# Patient Record
Sex: FEMALE | Race: WHITE | NOT HISPANIC OR LATINO | Employment: UNEMPLOYED | URBAN - METROPOLITAN AREA
[De-identification: names, ages, dates, MRNs, and addresses within clinical notes are randomized per-mention and may not be internally consistent; named-entity substitution may affect disease eponyms.]

---

## 2017-12-20 ENCOUNTER — HOSPITAL ENCOUNTER (OUTPATIENT)
Dept: RADIOLOGY | Facility: MEDICAL CENTER | Age: 57
End: 2017-12-20
Attending: FAMILY MEDICINE
Payer: COMMERCIAL

## 2017-12-20 DIAGNOSIS — Z12.31 SCREENING MAMMOGRAM, ENCOUNTER FOR: ICD-10-CM

## 2017-12-20 PROCEDURE — G0202 SCR MAMMO BI INCL CAD: HCPCS

## 2020-01-14 ENCOUNTER — HOSPITAL ENCOUNTER (OUTPATIENT)
Dept: RADIOLOGY | Facility: MEDICAL CENTER | Age: 60
End: 2020-01-14
Attending: FAMILY MEDICINE
Payer: COMMERCIAL

## 2020-01-14 DIAGNOSIS — Z12.31 VISIT FOR SCREENING MAMMOGRAM: ICD-10-CM

## 2020-01-14 PROCEDURE — 77067 SCR MAMMO BI INCL CAD: CPT

## 2020-12-18 ENCOUNTER — HOSPITAL ENCOUNTER (OUTPATIENT)
Dept: RADIOLOGY | Facility: MEDICAL CENTER | Age: 60
End: 2020-12-18
Attending: FAMILY MEDICINE
Payer: COMMERCIAL

## 2020-12-18 DIAGNOSIS — M85.89 OTHER SPECIFIED DISORDERS OF BONE DENSITY AND STRUCTURE, MULTIPLE SITES: ICD-10-CM

## 2020-12-18 PROCEDURE — 77080 DXA BONE DENSITY AXIAL: CPT

## 2021-01-18 DIAGNOSIS — M85.89 OTHER SPECIFIED DISORDERS OF BONE DENSITY AND STRUCTURE, MULTIPLE SITES: ICD-10-CM

## 2021-10-05 ENCOUNTER — HOSPITAL ENCOUNTER (OUTPATIENT)
Dept: RADIOLOGY | Facility: MEDICAL CENTER | Age: 61
End: 2021-10-05
Attending: FAMILY MEDICINE
Payer: COMMERCIAL

## 2021-10-05 DIAGNOSIS — B17.9 ACUTE VIRAL HEPATITIS, UNSPECIFIED VIRAL HEPATITIS TYPE: ICD-10-CM

## 2021-10-05 PROCEDURE — 76700 US EXAM ABDOM COMPLETE: CPT

## 2021-12-20 ENCOUNTER — HOSPITAL ENCOUNTER (OUTPATIENT)
Dept: RADIOLOGY | Facility: MEDICAL CENTER | Age: 61
End: 2021-12-20
Payer: COMMERCIAL

## 2021-12-20 DIAGNOSIS — R29.890 LOSS OF HEIGHT: ICD-10-CM

## 2021-12-20 PROCEDURE — 77080 DXA BONE DENSITY AXIAL: CPT

## 2022-01-10 ENCOUNTER — HOSPITAL ENCOUNTER (OUTPATIENT)
Dept: LAB | Facility: MEDICAL CENTER | Age: 62
End: 2022-01-10
Attending: FAMILY MEDICINE
Payer: COMMERCIAL

## 2022-01-10 LAB
ALBUMIN SERPL BCP-MCNC: 3.8 G/DL (ref 3.2–4.9)
ALBUMIN/GLOB SERPL: 1.3 G/DL
ALP SERPL-CCNC: 51 U/L (ref 30–99)
ALT SERPL-CCNC: 162 U/L (ref 2–50)
ANION GAP SERPL CALC-SCNC: 10 MMOL/L (ref 7–16)
AST SERPL-CCNC: 88 U/L (ref 12–45)
BASOPHILS # BLD AUTO: 0.2 % (ref 0–1.8)
BASOPHILS # BLD: 0.02 K/UL (ref 0–0.12)
BILIRUB SERPL-MCNC: 0.4 MG/DL (ref 0.1–1.5)
BUN SERPL-MCNC: 11 MG/DL (ref 8–22)
CALCIUM SERPL-MCNC: 8.6 MG/DL (ref 8.4–10.2)
CHLORIDE SERPL-SCNC: 99 MMOL/L (ref 96–112)
CO2 SERPL-SCNC: 26 MMOL/L (ref 20–33)
CREAT SERPL-MCNC: 0.66 MG/DL (ref 0.5–1.4)
CRP SERPL HS-MCNC: 18.6 MG/L (ref 0–3)
EOSINOPHIL # BLD AUTO: 0.04 K/UL (ref 0–0.51)
EOSINOPHIL NFR BLD: 0.5 % (ref 0–6.9)
ERYTHROCYTE [DISTWIDTH] IN BLOOD BY AUTOMATED COUNT: 46.9 FL (ref 35.9–50)
ERYTHROCYTE [SEDIMENTATION RATE] IN BLOOD BY WESTERGREN METHOD: 16 MM/HOUR (ref 0–25)
GLOBULIN SER CALC-MCNC: 2.9 G/DL (ref 1.9–3.5)
GLUCOSE SERPL-MCNC: 109 MG/DL (ref 65–99)
HCT VFR BLD AUTO: 39.3 % (ref 37–47)
HGB BLD-MCNC: 12.5 G/DL (ref 12–16)
IMM GRANULOCYTES # BLD AUTO: 0.02 K/UL (ref 0–0.11)
IMM GRANULOCYTES NFR BLD AUTO: 0.2 % (ref 0–0.9)
LYMPHOCYTES # BLD AUTO: 1.3 K/UL (ref 1–4.8)
LYMPHOCYTES NFR BLD: 15.3 % (ref 22–41)
MCH RBC QN AUTO: 29.5 PG (ref 27–33)
MCHC RBC AUTO-ENTMCNC: 31.8 G/DL (ref 33.6–35)
MCV RBC AUTO: 92.7 FL (ref 81.4–97.8)
MONOCYTES # BLD AUTO: 0.36 K/UL (ref 0–0.85)
MONOCYTES NFR BLD AUTO: 4.2 % (ref 0–13.4)
NEUTROPHILS # BLD AUTO: 6.77 K/UL (ref 2–7.15)
NEUTROPHILS NFR BLD: 79.6 % (ref 44–72)
NRBC # BLD AUTO: 0 K/UL
NRBC BLD-RTO: 0 /100 WBC
PLATELET # BLD AUTO: 284 K/UL (ref 164–446)
PMV BLD AUTO: 9.4 FL (ref 9–12.9)
POTASSIUM SERPL-SCNC: 4.2 MMOL/L (ref 3.6–5.5)
PROT SERPL-MCNC: 6.7 G/DL (ref 6–8.2)
RBC # BLD AUTO: 4.24 M/UL (ref 4.2–5.4)
RHEUMATOID FACT SER IA-ACNC: 15 IU/ML (ref 0–14)
SODIUM SERPL-SCNC: 135 MMOL/L (ref 135–145)
WBC # BLD AUTO: 8.5 K/UL (ref 4.8–10.8)

## 2022-01-10 PROCEDURE — 36415 COLL VENOUS BLD VENIPUNCTURE: CPT

## 2022-01-10 PROCEDURE — 80053 COMPREHEN METABOLIC PANEL: CPT

## 2022-01-10 PROCEDURE — 86431 RHEUMATOID FACTOR QUANT: CPT

## 2022-01-10 PROCEDURE — 85025 COMPLETE CBC W/AUTO DIFF WBC: CPT

## 2022-01-10 PROCEDURE — 86141 C-REACTIVE PROTEIN HS: CPT

## 2022-01-10 PROCEDURE — 86038 ANTINUCLEAR ANTIBODIES: CPT

## 2022-01-10 PROCEDURE — 85652 RBC SED RATE AUTOMATED: CPT

## 2022-01-13 LAB — NUCLEAR IGG SER QL IA: NORMAL

## 2022-04-12 ENCOUNTER — HOSPITAL ENCOUNTER (OUTPATIENT)
Dept: RADIOLOGY | Facility: MEDICAL CENTER | Age: 62
End: 2022-04-12
Attending: FAMILY MEDICINE
Payer: COMMERCIAL

## 2022-04-12 DIAGNOSIS — Z12.31 VISIT FOR SCREENING MAMMOGRAM: ICD-10-CM

## 2022-04-12 PROCEDURE — 77063 BREAST TOMOSYNTHESIS BI: CPT

## 2023-06-21 ENCOUNTER — APPOINTMENT (OUTPATIENT)
Dept: RADIOLOGY | Facility: MEDICAL CENTER | Age: 63
End: 2023-06-21
Attending: FAMILY MEDICINE
Payer: COMMERCIAL

## 2023-06-21 DIAGNOSIS — R06.00 DYSPNEA, UNSPECIFIED TYPE: ICD-10-CM

## 2023-06-21 PROCEDURE — 71046 X-RAY EXAM CHEST 2 VIEWS: CPT

## 2024-01-23 ENCOUNTER — HOSPITAL ENCOUNTER (OUTPATIENT)
Dept: RADIOLOGY | Facility: MEDICAL CENTER | Age: 64
End: 2024-01-23
Attending: FAMILY MEDICINE
Payer: COMMERCIAL

## 2024-01-23 DIAGNOSIS — M85.89 OTHER SPECIFIED DISORDERS OF BONE DENSITY AND STRUCTURE, MULTIPLE SITES: ICD-10-CM

## 2024-01-23 PROCEDURE — 77080 DXA BONE DENSITY AXIAL: CPT

## 2024-04-03 ENCOUNTER — HOSPITAL ENCOUNTER (OUTPATIENT)
Dept: RADIOLOGY | Facility: MEDICAL CENTER | Age: 64
End: 2024-04-03
Attending: FAMILY MEDICINE
Payer: COMMERCIAL

## 2024-04-03 DIAGNOSIS — Z12.31 ENCOUNTER FOR MAMMOGRAM TO ESTABLISH BASELINE MAMMOGRAM: ICD-10-CM

## 2024-04-03 PROCEDURE — 77067 SCR MAMMO BI INCL CAD: CPT

## 2024-04-15 ENCOUNTER — HOSPITAL ENCOUNTER (EMERGENCY)
Facility: MEDICAL CENTER | Age: 64
End: 2024-04-15
Attending: EMERGENCY MEDICINE
Payer: COMMERCIAL

## 2024-04-15 ENCOUNTER — APPOINTMENT (OUTPATIENT)
Dept: RADIOLOGY | Facility: MEDICAL CENTER | Age: 64
End: 2024-04-15
Attending: EMERGENCY MEDICINE
Payer: COMMERCIAL

## 2024-04-15 VITALS
OXYGEN SATURATION: 98 % | RESPIRATION RATE: 18 BRPM | HEIGHT: 69 IN | TEMPERATURE: 98.4 F | SYSTOLIC BLOOD PRESSURE: 102 MMHG | WEIGHT: 175 LBS | HEART RATE: 79 BPM | BODY MASS INDEX: 25.92 KG/M2 | DIASTOLIC BLOOD PRESSURE: 63 MMHG

## 2024-04-15 DIAGNOSIS — I48.0 PAROXYSMAL ATRIAL FIBRILLATION (HCC): ICD-10-CM

## 2024-04-15 LAB
ALBUMIN SERPL BCP-MCNC: 4.4 G/DL (ref 3.2–4.9)
ALBUMIN/GLOB SERPL: 1.3 G/DL
ALP SERPL-CCNC: 73 U/L (ref 30–99)
ALT SERPL-CCNC: 70 U/L (ref 2–50)
ANION GAP SERPL CALC-SCNC: 13 MMOL/L (ref 7–16)
APTT PPP: 25.8 SEC (ref 24.7–36)
AST SERPL-CCNC: 54 U/L (ref 12–45)
BASOPHILS # BLD AUTO: 0.3 % (ref 0–1.8)
BASOPHILS # BLD: 0.03 K/UL (ref 0–0.12)
BILIRUB SERPL-MCNC: 0.5 MG/DL (ref 0.1–1.5)
BUN SERPL-MCNC: 13 MG/DL (ref 8–22)
CALCIUM ALBUM COR SERPL-MCNC: 9.2 MG/DL (ref 8.5–10.5)
CALCIUM SERPL-MCNC: 9.5 MG/DL (ref 8.4–10.2)
CHLORIDE SERPL-SCNC: 99 MMOL/L (ref 96–112)
CO2 SERPL-SCNC: 26 MMOL/L (ref 20–33)
CREAT SERPL-MCNC: 0.63 MG/DL (ref 0.5–1.4)
EKG IMPRESSION: NORMAL
EOSINOPHIL # BLD AUTO: 0.02 K/UL (ref 0–0.51)
EOSINOPHIL NFR BLD: 0.2 % (ref 0–6.9)
ERYTHROCYTE [DISTWIDTH] IN BLOOD BY AUTOMATED COUNT: 51.5 FL (ref 35.9–50)
GFR SERPLBLD CREATININE-BSD FMLA CKD-EPI: 101 ML/MIN/1.73 M 2
GLOBULIN SER CALC-MCNC: 3.3 G/DL (ref 1.9–3.5)
GLUCOSE SERPL-MCNC: 132 MG/DL (ref 65–99)
HCT VFR BLD AUTO: 41.7 % (ref 37–47)
HGB BLD-MCNC: 13.7 G/DL (ref 12–16)
IMM GRANULOCYTES # BLD AUTO: 0.03 K/UL (ref 0–0.11)
IMM GRANULOCYTES NFR BLD AUTO: 0.3 % (ref 0–0.9)
INR PPP: 0.89 (ref 0.87–1.13)
LYMPHOCYTES # BLD AUTO: 1.82 K/UL (ref 1–4.8)
LYMPHOCYTES NFR BLD: 20 % (ref 22–41)
MCH RBC QN AUTO: 29.8 PG (ref 27–33)
MCHC RBC AUTO-ENTMCNC: 32.9 G/DL (ref 32.2–35.5)
MCV RBC AUTO: 90.7 FL (ref 81.4–97.8)
MONOCYTES # BLD AUTO: 0.54 K/UL (ref 0–0.85)
MONOCYTES NFR BLD AUTO: 5.9 % (ref 0–13.4)
NEUTROPHILS # BLD AUTO: 6.68 K/UL (ref 1.82–7.42)
NEUTROPHILS NFR BLD: 73.3 % (ref 44–72)
NRBC # BLD AUTO: 0 K/UL
NRBC BLD-RTO: 0 /100 WBC (ref 0–0.2)
NT-PROBNP SERPL IA-MCNC: 521 PG/ML (ref 0–125)
NT-PROBNP SERPL IA-MCNC: 538 PG/ML (ref 0–125)
PLATELET # BLD AUTO: 291 K/UL (ref 164–446)
PMV BLD AUTO: 10 FL (ref 9–12.9)
POTASSIUM SERPL-SCNC: 4.2 MMOL/L (ref 3.6–5.5)
PROT SERPL-MCNC: 7.7 G/DL (ref 6–8.2)
PROTHROMBIN TIME: 12.5 SEC (ref 12–14.6)
RBC # BLD AUTO: 4.6 M/UL (ref 4.2–5.4)
SODIUM SERPL-SCNC: 138 MMOL/L (ref 135–145)
TROPONIN T SERPL-MCNC: 10 NG/L (ref 6–19)
TSH SERPL DL<=0.005 MIU/L-ACNC: 0.42 UIU/ML (ref 0.38–5.33)
WBC # BLD AUTO: 9.1 K/UL (ref 4.8–10.8)

## 2024-04-15 PROCEDURE — 36415 COLL VENOUS BLD VENIPUNCTURE: CPT

## 2024-04-15 PROCEDURE — 84443 ASSAY THYROID STIM HORMONE: CPT

## 2024-04-15 PROCEDURE — 85610 PROTHROMBIN TIME: CPT

## 2024-04-15 PROCEDURE — 85025 COMPLETE CBC W/AUTO DIFF WBC: CPT

## 2024-04-15 PROCEDURE — 84484 ASSAY OF TROPONIN QUANT: CPT

## 2024-04-15 PROCEDURE — 93005 ELECTROCARDIOGRAM TRACING: CPT | Performed by: EMERGENCY MEDICINE

## 2024-04-15 PROCEDURE — 99284 EMERGENCY DEPT VISIT MOD MDM: CPT

## 2024-04-15 PROCEDURE — 71045 X-RAY EXAM CHEST 1 VIEW: CPT

## 2024-04-15 PROCEDURE — 85730 THROMBOPLASTIN TIME PARTIAL: CPT

## 2024-04-15 PROCEDURE — 80053 COMPREHEN METABOLIC PANEL: CPT

## 2024-04-15 PROCEDURE — 83880 ASSAY OF NATRIURETIC PEPTIDE: CPT

## 2024-04-15 NOTE — ED PROVIDER NOTES
ED Provider Note    CHIEF COMPLAINT  Chief Complaint   Patient presents with    Arrhythmia       EXTERNAL RECORDS REVIEWED  Reviewed outpatient orthopedic records    HPI/ROS  LIMITATION TO HISTORY   None  OUTSIDE HISTORIAN(S):  Friend provided additional history    Elvira Waldrop is a 63 y.o. female who presents for evaluation of palpitations.  The patient felt the onset of palpitations with some intermittent dizziness around 2 hours prior to arrival.  She specifically denies chest pain leg swelling or strokelike symptoms such as numbness tingling weakness of arms legs or face or difficulty word finding.  She has no known history of cardiopulmonary disease process.  No history of arrhythmia specifically.  No recent new medications.  She denies any fevers or chills or productive cough    PAST MEDICAL HISTORY   History of arthritis polymyalgia rheumatica    SURGICAL HISTORY   has a past surgical history that includes gyn surgery.    FAMILY HISTORY  History reviewed. No pertinent family history.    SOCIAL HISTORY  Social History     Tobacco Use    Smoking status: Never     Passive exposure: Never    Smokeless tobacco: Never   Substance and Sexual Activity    Alcohol use: Yes    Drug use: No    Sexual activity: Not on file       CURRENT MEDICATIONS  Home Medications    **Home medications have not yet been reviewed for this encounter**       No current facility-administered medications for this encounter.    Current Outpatient Medications:     amoxicillin (AMOXIL) 500 MG Cap, Take 4 Capsules by mouth 1 time a day as needed (30-45 minutes before invasive procedure)., Disp: 16 Capsule, Rfl: 3    Tiotropium Bromide Monohydrate (SPIRIVA RESPIMAT) 1.25 MCG/ACT Aero Soln, INHALE 2 PUFFS INTO THE LUNGS EVERY DAY, Disp: , Rfl:     methocarbamol (ROBAXIN) 500 MG Tab, TAKE 1 TABLET (500 MG) BY MOUTH 3 TIMES DAILY AS NEEDED FOR UP TO 10 DAYS., Disp: , Rfl:     pantoprazole (PROTONIX) 40 MG Tablet Delayed Response, TAKE 1 TABLET  "(40 MG) BY MOUTH DAILY AT BEDTIME FOR 30 DAYS. TAKE 30 MINUTES BEFORE MEAL, Disp: , Rfl:     predniSONE (DELTASONE) 1 MG Tab, TAKE 1-4 TABLETS BY MOUTH ONCE DAILY. UP TO 15MG TOTAL/DAY, Disp: , Rfl:     predniSONE (DELTASONE) 5 MG Tab, TAKE 1 TO 2 TABLET BY MOUTH EVERY DAY AS DIRECTED (TAKING UP TO 15 MG A DAY), Disp: , Rfl:     traMADol (ULTRAM) 50 MG Tab, Take 1 Tablet by mouth every 6 hours as needed., Disp: , Rfl:     zolpidem (AMBIEN) 10 MG Tab, 1/2 to 1 PO qhs PRN sleep, Disp: , Rfl:     HYDROcodone-acetaminophen (NORCO) 5-325 MG Tab per tablet, TAKE 1 TABLET BY MOUTH EVERY 6 HOURS AS NEEDED FOR UP TO 5 DAYS., Disp: , Rfl:     denosumab (PROLIA) 60 MG/ML Solution Prefilled Syringe injection, Prolia 60 mg/mL subcutaneous syringe  1 ml q 6 months, Disp: , Rfl:     alendronate (FOSAMAX) 70 MG Tab, alendronate 70 mg tablet  Take 1 tablet every week by oral route., Disp: , Rfl:     albuterol 108 (90 Base) MCG/ACT Aero Soln inhalation aerosol, INHALE 2 PUFFS EVERY 4 HOURS BY INHALATION ROUTE AS NEEDED, Disp: , Rfl:     ALLERGIES  No Known Allergies    PHYSICAL EXAM  VITAL SIGNS: /63   Pulse 79   Temp 36.9 °C (98.4 °F) (Temporal)   Resp 18   Ht 1.753 m (5' 9\")   Wt 79.4 kg (175 lb)   SpO2 98%   BMI 25.84 kg/m²    Pulse ox interpretation: I interpret this pulse ox as normal.  Constitutional: Alert and oriented x 3, no acute distress  HEENT: Atraumatic normocephalic, pupils are equal round reactive to light extraocular movements are intact. The nares is clear, external ears are normal, mouth shows moist mucous membranes normal dentition for age  Neck: Supple, no JVD no tracheal deviation  Cardiovascular: Tachycardic irregularly irregular murmur rub or gallop 2+ pulses peripherally x4  Thorax & Lungs: No respiratory distress, no wheezes rales or rhonchi, No chest tenderness.   GI: Soft nontender nondistended positive bowel sounds, no peritoneal signs  Skin: Warm dry no acute rash or lesion  Musculoskeletal: " Moving all extremities with full range and 5 of 5 strength no acute  deformity  Neurologic: Cranial nerves III through XII are grossly intact no sensory deficit no cerebellar dysfunction   Psychiatric: Appropriate affect for situation at this time          EKG/LABS  Results for orders placed or performed during the hospital encounter of 04/15/24   TSH WITH REFLEX TO FT4   Result Value Ref Range    TSH 0.419 0.380 - 5.330 uIU/mL   CBC WITH DIFFERENTIAL   Result Value Ref Range    WBC 9.1 4.8 - 10.8 K/uL    RBC 4.60 4.20 - 5.40 M/uL    Hemoglobin 13.7 12.0 - 16.0 g/dL    Hematocrit 41.7 37.0 - 47.0 %    MCV 90.7 81.4 - 97.8 fL    MCH 29.8 27.0 - 33.0 pg    MCHC 32.9 32.2 - 35.5 g/dL    RDW 51.5 (H) 35.9 - 50.0 fL    Platelet Count 291 164 - 446 K/uL    MPV 10.0 9.0 - 12.9 fL    Neutrophils-Polys 73.30 (H) 44.00 - 72.00 %    Lymphocytes 20.00 (L) 22.00 - 41.00 %    Monocytes 5.90 0.00 - 13.40 %    Eosinophils 0.20 0.00 - 6.90 %    Basophils 0.30 0.00 - 1.80 %    Immature Granulocytes 0.30 0.00 - 0.90 %    Nucleated RBC 0.00 0.00 - 0.20 /100 WBC    Neutrophils (Absolute) 6.68 1.82 - 7.42 K/uL    Lymphs (Absolute) 1.82 1.00 - 4.80 K/uL    Monos (Absolute) 0.54 0.00 - 0.85 K/uL    Eos (Absolute) 0.02 0.00 - 0.51 K/uL    Baso (Absolute) 0.03 0.00 - 0.12 K/uL    Immature Granulocytes (abs) 0.03 0.00 - 0.11 K/uL    NRBC (Absolute) 0.00 K/uL   Comp Metabolic Panel   Result Value Ref Range    Sodium 138 135 - 145 mmol/L    Potassium 4.2 3.6 - 5.5 mmol/L    Chloride 99 96 - 112 mmol/L    Co2 26 20 - 33 mmol/L    Anion Gap 13.0 7.0 - 16.0    Glucose 132 (H) 65 - 99 mg/dL    Bun 13 8 - 22 mg/dL    Creatinine 0.63 0.50 - 1.40 mg/dL    Calcium 9.5 8.4 - 10.2 mg/dL    Correct Calcium 9.2 8.5 - 10.5 mg/dL    AST(SGOT) 54 (H) 12 - 45 U/L    ALT(SGPT) 70 (H) 2 - 50 U/L    Alkaline Phosphatase 73 30 - 99 U/L    Total Bilirubin 0.5 0.1 - 1.5 mg/dL    Albumin 4.4 3.2 - 4.9 g/dL    Total Protein 7.7 6.0 - 8.2 g/dL    Globulin 3.3 1.9 - 3.5  g/dL    A-G Ratio 1.3 g/dL   TROPONIN   Result Value Ref Range    Troponin T 10 6 - 19 ng/L   Prothrombin Time   Result Value Ref Range    PT 12.5 12.0 - 14.6 sec    INR 0.89 0.87 - 1.13   APTT   Result Value Ref Range    APTT 25.8 24.7 - 36.0 sec   proBrain Natriuretic Peptide, NT   Result Value Ref Range    NT-proBNP 538 (H) 0 - 125 pg/mL   proBrain Natriuretic Peptide, NT   Result Value Ref Range    NT-proBNP 521 (H) 0 - 125 pg/mL   ESTIMATED GFR   Result Value Ref Range    GFR (CKD-EPI) 101 >60 mL/min/1.73 m 2   EKG   Result Value Ref Range    Report       [facility] Emergency Dept.    Test Date:  2024-04-15  Pt Name:    MARIJA ABDI                    Department: [facility]  MRN:        [id number]                      Room:       -ROOM 3  Gender:     Female                       Technician: [id number]  :        1960                   Requested By:WENDI STOKES  Order #:    [id number]                    Reading MD:    Measurements  Intervals                                Axis  Rate:       81                           P:          84  MD:         143                          QRS:        60  QRSD:       89                           T:          36  QT:         363  QTc:        422    Interpretive Statements  Sinus rhythm  Borderline ST elevation, anterior leads  No previous ECG available for comparison        I have independently interpreted this EKG  EKG #1 twelve-lead EKG interpretation by me atrial fibrillation with rapid ventricular response.  No acute ST segment elevation or depression no pathological T wave versions    EKG #2 sinus rhythm nonspecific T wave changes without STEMI morphology rate normal intervals normal axis normal    RADIOLOGY/PROCEDURES   I have independently interpreted the diagnostic imaging associated with this visit and am waiting the final reading from the radiologist.   My preliminary interpretation is as follows: No evidence of acute heart failure    Radiologist  interpretation:  DX-CHEST-PORTABLE (1 VIEW)   Final Result         1. No acute cardiopulmonary abnormalities are identified.          COURSE & MEDICAL DECISION MAKING    ASSESSMENT, COURSE AND PLAN  Care Narrative:     This is a very pleasant otherwise healthy 63-year-old female presented here with palpitations.  She was immediately brought back to a monitored room.  She appears to have new onset atrial fibrillation with rapid ventricular response on her initial EKG.  I had ordered extensive blood testing including CBC metabolic panel troponin and thyroid studies all of which were normal.  We did not intervene regarding any AV magdalene blockers or medication and the patient spontaneously converted back to a sinus rhythm.  We confirmed this with an EKG.  She was observed for an additional 2 hours without any return of tacky dysrhythmia.  Patient has no known structural heart disease.  I considered but did not feel that admission is mandatory for this.  We will urgently refer her to cardiology.  I recommended she start 81 mg enteric-coated aspirin and to return as needed for new or worsening symptoms.          ADDITIONAL PROBLEMS MANAGED      DISPOSITION AND DISCUSSIONS  I have discussed management of the patient with the following physicians and BLAYNE's: None    Discussion of management with other [facility] or appropriate source(s): None    Escalation of care considered, and ultimately not performed: Considered admission    Barriers to care at this time, including but not limited to: None.     Decision tools and prescription drugs considered including, but not limited to: None.    FINAL DIAGNOSIS  1. Paroxysmal atrial fibrillation (HCC)           Electronically signed by: Dillon Mack M.D., 4/15/2024 3:25 PM

## 2024-04-15 NOTE — ED TRIAGE NOTES
Pt presents from PCP with sister for arrhythmia - a fib RVR. No history. Repots she felt onset of a fast heart rate at 1100 today. Exertion causes weakness and heaviness in arms and legs. Pt A&Ox4 and ambulatory.

## 2024-04-16 NOTE — DISCHARGE INSTRUCTIONS
Start taking as baby 81 mg enteric-coated aspirin.  Return to the emergency department immediately for strokelike symptoms chest pain palpitations or any new or worsening symptoms

## 2024-04-29 ENCOUNTER — TELEPHONE (OUTPATIENT)
Dept: HEALTH INFORMATION MANAGEMENT | Facility: OTHER | Age: 64
End: 2024-04-29
Payer: COMMERCIAL

## 2024-05-03 ENCOUNTER — HOSPITAL ENCOUNTER (OUTPATIENT)
Dept: RADIOLOGY | Facility: MEDICAL CENTER | Age: 64
End: 2024-05-03
Attending: INTERNAL MEDICINE
Payer: COMMERCIAL

## 2024-05-03 DIAGNOSIS — R79.89 ABNORMAL LIVER FUNCTION TESTS: ICD-10-CM

## 2024-05-20 ENCOUNTER — HOSPITAL ENCOUNTER (OUTPATIENT)
Dept: LAB | Facility: MEDICAL CENTER | Age: 64
End: 2024-05-20
Attending: INTERNAL MEDICINE
Payer: COMMERCIAL

## 2024-05-20 LAB
ALBUMIN SERPL BCP-MCNC: 4.1 G/DL (ref 3.2–4.9)
ALP SERPL-CCNC: 51 U/L (ref 30–99)
ALT SERPL-CCNC: 29 U/L (ref 2–50)
AST SERPL-CCNC: 29 U/L (ref 12–45)
BILIRUB CONJ SERPL-MCNC: <0.2 MG/DL (ref 0.1–0.5)
BILIRUB INDIRECT SERPL-MCNC: NORMAL MG/DL (ref 0–1)
BILIRUB SERPL-MCNC: 0.5 MG/DL (ref 0.1–1.5)
PROT SERPL-MCNC: 6.7 G/DL (ref 6–8.2)

## 2025-05-12 ENCOUNTER — HOSPITAL ENCOUNTER (OUTPATIENT)
Dept: RADIOLOGY | Facility: MEDICAL CENTER | Age: 65
End: 2025-05-12
Attending: PHYSICIAN ASSISTANT
Payer: COMMERCIAL

## 2025-05-12 DIAGNOSIS — M79.89 OTHER SPECIFIED SOFT TISSUE DISORDERS: ICD-10-CM

## 2025-05-12 PROCEDURE — 93970 EXTREMITY STUDY: CPT
